# Patient Record
Sex: FEMALE | Employment: OTHER | URBAN - METROPOLITAN AREA
[De-identification: names, ages, dates, MRNs, and addresses within clinical notes are randomized per-mention and may not be internally consistent; named-entity substitution may affect disease eponyms.]

---

## 2018-01-20 ENCOUNTER — HOSPITAL ENCOUNTER (EMERGENCY)
Age: 83
Discharge: HOME | End: 2018-01-20
Attending: EMERGENCY MEDICINE
Payer: MEDICARE

## 2018-01-20 ENCOUNTER — APPOINTMENT (OUTPATIENT)
Dept: GENERAL RADIOLOGY | Age: 83
End: 2018-01-20
Attending: EMERGENCY MEDICINE
Payer: MEDICARE

## 2018-01-20 VITALS
HEART RATE: 71 BPM | RESPIRATION RATE: 19 BRPM | WEIGHT: 116.8 LBS | OXYGEN SATURATION: 99 % | BODY MASS INDEX: 22.93 KG/M2 | DIASTOLIC BLOOD PRESSURE: 71 MMHG | TEMPERATURE: 98.2 F | HEIGHT: 60 IN | SYSTOLIC BLOOD PRESSURE: 140 MMHG
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
## 2018-01-20 PROCEDURE — 99284 EMERGENCY DEPT VISIT MOD MDM: CPT

## 2018-01-20 PROCEDURE — 71101 X-RAY EXAM UNILAT RIBS/CHEST: CPT

## 2018-01-20 RX ORDER — METHOCARBAMOL 750 MG/1
750 TABLET, FILM COATED ORAL
Qty: 20 TAB | Refills: 0 | Status: SHIPPED | OUTPATIENT
Start: 2018-01-20

## 2018-01-20 RX ORDER — OMEPRAZOLE 20 MG/1
20 CAPSULE, DELAYED RELEASE ORAL
COMMUNITY

## 2018-01-20 RX ORDER — DOCUSATE SODIUM 100 MG/1
100 CAPSULE, LIQUID FILLED ORAL
COMMUNITY

## 2018-01-20 RX ORDER — LEVOTHYROXINE SODIUM 150 UG/1
150 TABLET ORAL
COMMUNITY

## 2018-01-20 NOTE — PROGRESS NOTES
Prior to Admission Medications   Prescriptions Last Dose Informant Patient Reported? Taking? CALCIUM PHOSPHATE TRIB/VIT D3 (CITRACAL + D PO) 2018 at 2100  Yes Yes   Sig: Take 2 capsules by mouth daily. Denosumab (PROLIA) 60 mg/mL injection Not Taking at Unknown time  Yes No   Si mg by SubCUTAneous route Multiple. Every 6 months   INSULIN ASPART (NOVOLOG SC) 2018 at Unknown time  Yes Yes   Sig: by SubCUTAneous route as needed. Before lunch and dinner   docusate sodium (COLACE) 100 mg capsule 2018 at 2100  Yes Yes   Sig: Take 100 mg by mouth nightly. insulin glargine (TOUJEO SOLOSTAR) 300 unit/mL (1.5 mL) inpn 2018 at 2100  Yes Yes   Si Units by SubCUTAneous route nightly. levothyroxine (SYNTHROID) 150 mcg tablet 2018 at 0800  Yes Yes   Sig: Take 150 mcg by mouth Daily (before breakfast). omeprazole (PRILOSEC) 20 mg capsule 2017 at Unknown time  Yes Yes   Sig: Take 20 mg by mouth daily as needed. polyethylene glycol (MIRALAX) 17 gram/dose powder Not Taking at Unknown time  Yes No   Sig: Take 17 g by mouth two (2) times a day. Facility-Administered Medications: None   Self and son Dot Londono): List updated per patient and son Dot Londono) interview. Prilosec, colace and toujeo added. Synthroid and novolog dosage updated. Zocor, zoloft, duragesic, norco, lisinopril, and senna removed. Denies use of any other supplements, drops, creams and patches.

## 2018-01-20 NOTE — ED TRIAGE NOTES
Triage note: Pt states she fell 2 days ago. Pt states she thinks she broke her rib on the lower right chest.  Denies syncope.

## 2018-01-20 NOTE — ED PROVIDER NOTES
HPI Comments: Olman Webster is a [address] y.o. female who presents by way of wheelchair to the ED with  c/o right sided pain post fall. Patient states she fell two days ago onto her right side, hitting her ribs and head. Patient denies syncopal symptoms, stating she turned too quickly and fell. Patients son, who is at bedside and is a family physician, states this happens frequently and \"is vestibular in nature. \" Patient and family refuse the need for a head CT as patient is not on anticoagulation nor did she lose consciousness. Patient states she simply wants to know if she broke a rib. Patient denies chest pain, denies any shortness of breath. Denies any nausea or vomiting. Denies any other injuries from a fall. There are no further complaints at this time. Of Note: patients  is hospitalized at this time. Patient lives alone with son there frequently. PCP: Hubert Stevens MD    PMHx significant for: Past Medical History:  No date: Arthritis  No date: Calculus of kidney      Comment: no hx of renal stones ;had gallstones  No date: Cancer [facility])      Comment: pancreatic  No date: Chronic pain  No date: Diabetes (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 The history is provided by the patient and a relative.         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         Family History:   Problem Relation Age of Onset    Psychiatric Disorder Son     Diabetes Child     Heart Disease Child     Diabetes Mother        Social History     Social History    Marital status:      Spouse name: N/A    Number of children: N/A    Years of education: N/A     Occupational History    retired      Social History Main Topics    Smoking status: Never Smoker    Smokeless tobacco: Never Used    Alcohol use No    Drug use: No    Sexual activity: No     Other Topics Concern    Not on file     Social History Narrative         ALLERGIES: Review of patient's allergies indicates no known allergies. Review of Systems   Constitutional: Negative for appetite change, chills, diaphoresis, fatigue and fever. HENT: Negative for congestion, ear discharge, ear pain, sinus pain, sinus pressure, sore throat and trouble swallowing. Eyes: Negative for photophobia, pain, redness and visual disturbance. Respiratory: Negative for chest tightness, shortness of breath and wheezing. C/o pain right upper ribs   Cardiovascular: Negative for chest pain and palpitations. Gastrointestinal: Negative for abdominal distention, abdominal pain, nausea and vomiting. Endocrine: Negative. Genitourinary: Negative for difficulty urinating, flank pain, frequency and urgency. Musculoskeletal: Negative for back pain, neck pain and neck stiffness. Skin: Negative for color change, pallor, rash and wound. Allergic/Immunologic: Negative.     Neurological: Negative for dizziness, speech difficulty, weakness and headaches. Hematological: Does not bruise/bleed easily. Psychiatric/Behavioral: Negative for behavioral problems. The patient is not nervous/anxious. Vitals:    01/20/18 1005   BP: 149/76   Pulse: 70   Resp: 16   Temp: 98.1 °F (36.7 °C)   SpO2: 97%   Weight: 53 kg (116 lb 12.8 oz)   Height: 5' (1.524 m)            Physical Exam   Constitutional: She is oriented to person, place, and time. She appears well-developed and well-nourished. No distress. HENT:   Head: Normocephalic and atraumatic. Right Ear: External ear normal.   Left Ear: External ear normal.   Nose: Nose normal.   Mouth/Throat: Oropharynx is clear and moist.   Eyes: Conjunctivae and EOM are normal. Pupils are equal, round, and reactive to light. Right eye exhibits no discharge. Left eye exhibits no discharge. Neck: Normal range of motion. Neck supple. No JVD present. No tracheal deviation present. Cardiovascular: Normal rate, regular rhythm, normal heart sounds and intact distal pulses. Exam reveals no gallop. No murmur heard. Pulmonary/Chest: Effort normal and breath sounds normal. No respiratory distress. She has no wheezes. She has no rales. She exhibits tenderness (right upper ribs). Abdominal: Soft. Bowel sounds are normal. She exhibits no distension. There is no tenderness. There is no rebound and no guarding. Genitourinary:   Genitourinary Comments: Negative     Musculoskeletal: Normal range of motion. She exhibits tenderness (right upper ribs). She exhibits no edema. Neurological: She is alert and oriented to person, place, and time. Skin: Skin is warm and dry. No rash noted. No erythema. No pallor. Psychiatric: She has a normal mood and affect. Her behavior is normal. Judgment and thought content normal.   Nursing note and vitals reviewed.        MDM  Number of Diagnoses or Management Options  Accidental fall, initial encounter: new and requires workup  Diagnosis management comments: Plan:  Discharge to home and follow up with PCP. Patient and family refusing head CT. Amount and/or Complexity of Data Reviewed  Tests in the radiology section of CPT®: ordered and reviewed      ED Course    1030: Patient and family refuse need for head CT secondary to fall. 1110: Reviewed radiology reads: negative for acute injury. 1115: Reviewed plan of care with Dr. Sis Acharya. 1120: Spoke with family and patient about diagnosis and plan of care. All questions answered. Patient and family continue to decline need for head CT post fall. 11:26 AM  Pt has been reexamined. Pt has no new complaints, changes or physical findings. Care plan outlined and precautions discussed. All available results were reviewed with pt. All medications were reviewed with pt. All of pt's questions and concerns were addressed. Pt agrees to F/U as instructed and agrees to return to ED upon further deterioration. Pt is ready to go home.   Florida Upton NP        Procedures

## 2018-01-20 NOTE — DISCHARGE INSTRUCTIONS

## 2018-03-04 ENCOUNTER — HOSPITAL ENCOUNTER (EMERGENCY)
Age: 83
Discharge: HOME | End: 2018-03-04
Attending: EMERGENCY MEDICINE
Payer: MEDICARE

## 2018-03-04 ENCOUNTER — APPOINTMENT (OUTPATIENT)
Dept: CT IMAGING | Age: 83
End: 2018-03-04
Attending: EMERGENCY MEDICINE
Payer: MEDICARE

## 2018-03-04 ENCOUNTER — APPOINTMENT (OUTPATIENT)
Dept: GENERAL RADIOLOGY | Age: 83
End: 2018-03-04
Attending: EMERGENCY MEDICINE
Payer: MEDICARE

## 2018-03-04 VITALS
SYSTOLIC BLOOD PRESSURE: 174 MMHG | OXYGEN SATURATION: 95 % | BODY MASS INDEX: 21.6 KG/M2 | TEMPERATURE: 98 F | HEIGHT: 60 IN | DIASTOLIC BLOOD PRESSURE: 75 MMHG | RESPIRATION RATE: 16 BRPM | HEART RATE: 80 BPM | WEIGHT: 110 LBS

## 2018-03-04 DIAGNOSIS — R10.84 ABDOMINAL PAIN, GENERALIZED: ICD-10-CM

## 2018-03-04 DIAGNOSIS — S39.012A BACK STRAIN, INITIAL ENCOUNTER: Primary | ICD-10-CM

## 2018-03-04 DIAGNOSIS — S20.212A CONTUSION OF LEFT CHEST WALL, INITIAL ENCOUNTER: ICD-10-CM

## 2018-03-04 LAB
ALBUMIN SERPL-MCNC: 3.2 G/DL (ref 3.5–5)
ALBUMIN/GLOB SERPL: 0.6 {RATIO} (ref 1.1–2.2)
ALP SERPL-CCNC: 83 U/L (ref 45–117)
ALT SERPL-CCNC: 18 U/L (ref 12–78)
ANION GAP SERPL CALC-SCNC: 6 MMOL/L (ref 5–15)
APPEARANCE UR: ABNORMAL
AST SERPL-CCNC: 16 U/L (ref 15–37)
BACTERIA URNS QL MICRO: NEGATIVE /HPF
BASOPHILS # BLD: 0.1 K/UL (ref 0–0.1)
BASOPHILS NFR BLD: 1 % (ref 0–1)
BILIRUB SERPL-MCNC: 0.4 MG/DL (ref 0.2–1)
BILIRUB UR QL: NEGATIVE
BUN SERPL-MCNC: 22 MG/DL (ref 6–20)
BUN/CREAT SERPL: 24 (ref 12–20)
CALCIUM SERPL-MCNC: 9.6 MG/DL (ref 8.5–10.1)
CHLORIDE SERPL-SCNC: 103 MMOL/L (ref 97–108)
CO2 SERPL-SCNC: 28 MMOL/L (ref 21–32)
COLOR UR: ABNORMAL
CREAT SERPL-MCNC: 0.9 MG/DL (ref 0.55–1.02)
DIFFERENTIAL METHOD BLD: ABNORMAL
EOSINOPHIL # BLD: 0.3 K/UL (ref 0–0.4)
EOSINOPHIL NFR BLD: 2 % (ref 0–7)
EPITH CASTS URNS QL MICRO: ABNORMAL /LPF
ERYTHROCYTE [DISTWIDTH] IN BLOOD BY AUTOMATED COUNT: 13.3 % (ref 11.5–14.5)
GLOBULIN SER CALC-MCNC: 5 G/DL (ref 2–4)
GLUCOSE SERPL-MCNC: 216 MG/DL (ref 65–100)
GLUCOSE UR STRIP.AUTO-MCNC: 500 MG/DL
HCT VFR BLD AUTO: 39.9 % (ref 35–47)
HGB BLD-MCNC: 13 G/DL (ref 11.5–16)
HGB UR QL STRIP: ABNORMAL
IMM GRANULOCYTES # BLD: 0.2 K/UL (ref 0–0.04)
IMM GRANULOCYTES NFR BLD AUTO: 1 % (ref 0–0.5)
KETONES UR QL STRIP.AUTO: NEGATIVE MG/DL
LEUKOCYTE ESTERASE UR QL STRIP.AUTO: ABNORMAL
LYMPHOCYTES # BLD: 1.7 K/UL (ref 0.8–3.5)
LYMPHOCYTES NFR BLD: 11 % (ref 12–49)
MCH RBC QN AUTO: 30.7 PG (ref 26–34)
MCHC RBC AUTO-ENTMCNC: 32.6 G/DL (ref 30–36.5)
MCV RBC AUTO: 94.1 FL (ref 80–99)
MONOCYTES # BLD: 1.1 K/UL (ref 0–1)
MONOCYTES NFR BLD: 7 % (ref 5–13)
NEUTS SEG # BLD: 11.3 K/UL (ref 1.8–8)
NEUTS SEG NFR BLD: 77 % (ref 32–75)
NITRITE UR QL STRIP.AUTO: NEGATIVE
NRBC # BLD: 0 K/UL (ref 0–0.01)
NRBC BLD-RTO: 0 PER 100 WBC
PH UR STRIP: 6.5 [PH] (ref 5–8)
PLATELET # BLD AUTO: 399 K/UL (ref 150–400)
PMV BLD AUTO: 10.3 FL (ref 8.9–12.9)
POTASSIUM SERPL-SCNC: 4.5 MMOL/L (ref 3.5–5.1)
PROT SERPL-MCNC: 8.2 G/DL (ref 6.4–8.2)
PROT UR STRIP-MCNC: NEGATIVE MG/DL
RBC # BLD AUTO: 4.24 M/UL (ref 3.8–5.2)
RBC #/AREA URNS HPF: ABNORMAL /HPF (ref 0–5)
SODIUM SERPL-SCNC: 137 MMOL/L (ref 136–145)
SP GR UR REFRACTOMETRY: 1.01 (ref 1–1.03)
UROBILINOGEN UR QL STRIP.AUTO: 0.2 EU/DL (ref 0.2–1)
WBC # BLD AUTO: 14.7 K/UL (ref 3.6–11)
WBC URNS QL MICRO: ABNORMAL /HPF (ref 0–4)

## 2018-03-04 PROCEDURE — 96374 THER/PROPH/DIAG INJ IV PUSH: CPT

## 2018-03-04 PROCEDURE — 85025 COMPLETE CBC W/AUTO DIFF WBC: CPT | Performed by: EMERGENCY MEDICINE

## 2018-03-04 PROCEDURE — 74176 CT ABD & PELVIS W/O CONTRAST: CPT

## 2018-03-04 PROCEDURE — 87086 URINE CULTURE/COLONY COUNT: CPT | Performed by: EMERGENCY MEDICINE

## 2018-03-04 PROCEDURE — 71101 X-RAY EXAM UNILAT RIBS/CHEST: CPT

## 2018-03-04 PROCEDURE — 36415 COLL VENOUS BLD VENIPUNCTURE: CPT | Performed by: EMERGENCY MEDICINE

## 2018-03-04 PROCEDURE — 80053 COMPREHEN METABOLIC PANEL: CPT | Performed by: EMERGENCY MEDICINE

## 2018-03-04 PROCEDURE — 72100 X-RAY EXAM L-S SPINE 2/3 VWS: CPT

## 2018-03-04 PROCEDURE — 99284 EMERGENCY DEPT VISIT MOD MDM: CPT

## 2018-03-04 PROCEDURE — 72170 X-RAY EXAM OF PELVIS: CPT

## 2018-03-04 PROCEDURE — 81001 URINALYSIS AUTO W/SCOPE: CPT | Performed by: EMERGENCY MEDICINE

## 2018-03-04 PROCEDURE — 74011250636 HC RX REV CODE- 250/636: Performed by: EMERGENCY MEDICINE

## 2018-03-04 RX ORDER — KETOROLAC TROMETHAMINE 30 MG/ML
10 INJECTION, SOLUTION INTRAMUSCULAR; INTRAVENOUS
Status: COMPLETED | OUTPATIENT
Start: 2018-03-04 | End: 2018-03-04

## 2018-03-04 RX ADMIN — KETOROLAC TROMETHAMINE 10 MG: 30 INJECTION, SOLUTION INTRAMUSCULAR at 13:32

## 2018-03-04 NOTE — ED TRIAGE NOTES
Pt fell on her lower back this morning when attempted to sit in her which she was not aware  had fallen back. Pt did not his her head and had no LOC. Pt has a small skin tear to her right elbow with fall and complains of pain across her lower back.

## 2018-03-04 NOTE — ED PROVIDER NOTES
HPI Comments: 80 y.o. female with past medical history significant for GERD, Diabetes, and Cancer who presents from home via EMS with chief complaint of back pain. Pt states this morning she went to sit down and missed a chair causing her to fall backwards onto her lower back area. Pt denies a loss of consciousness from fall, but reports lower back pain. Patient states worsening symptoms since onset. Pt reports moderate lower back pain with no radiation. Pt states aggravation of symptoms with positional movement. Pt denies taking medication for symptoms prior to arrival. Pt states accompanying abdominal pain. Pt reports constant spinal pain at baseline, but notes an increase in pain post fall. Pt states a previous history of a fall and was seen at [facility] ED on 01/20/[facility]. Pt completed an x-ray and was discharged home. Pt denies fever, chills, cough, congestion, shortness of breath, chest pain, nausea, vomiting, diarrhea, difficulty with urination or dysuria. There are no other acute medical concerns at this time. PCP: Marion Rich MD    Note written by Tyler Molina, as dictated by Milton Messer MD 12:44 PM    The history is provided by the patient.         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         Family History:   Problem Relation Age of Onset    Psychiatric Disorder Son     Diabetes Child     Heart Disease Child     Diabetes Mother        Social History     Social History    Marital status:      Spouse name: N/A    Number of children: N/A    Years of education: N/A     Occupational History    retired      Social History Main Topics    Smoking status: Never Smoker    Smokeless tobacco: Never Used    Alcohol use No    Drug use: No    Sexual activity: No     Other Topics Concern    Not on file     Social History Narrative         ALLERGIES: Review of patient's allergies indicates no known allergies. Review of Systems   Constitutional: Negative for chills and fever. HENT: Negative for congestion. Respiratory: Negative for cough and shortness of breath. Cardiovascular: Negative for chest pain. Gastrointestinal: Positive for abdominal pain. Negative for diarrhea, nausea and vomiting. Genitourinary: Negative for difficulty urinating and dysuria. Musculoskeletal: Positive for back pain. All other systems reviewed and are negative. Vitals:    03/04/18 1227   BP: 174/75   Pulse: 80   Resp: 16   Temp: 98 °F (36.7 °C)   SpO2: 95%   Weight: 49.9 kg (110 lb)   Height: 5' (1.524 m)            Physical Exam   Constitutional: She appears well-developed and well-nourished. HENT:   Head: Normocephalic and atraumatic. Mouth/Throat: Oropharynx is clear and moist.   Eyes: EOM are normal. Pupils are equal, round, and reactive to light. Neck: Normal range of motion. Neck supple. Cardiovascular: Normal rate, regular rhythm and intact distal pulses. Exam reveals no gallop and no friction rub. Murmur heard. Systolic murmur is present with a grade of 3/6   Pulmonary/Chest: Effort normal. No respiratory distress. She has no wheezes. She has no rales. Tender over left distal posterior chest wall   Abdominal: Soft. There is tenderness. There is no rebound. Diffuse abdominal tenderness upper greater than lower   Musculoskeletal: Normal range of motion. She exhibits no tenderness. Neurological: She is alert. No cranial nerve deficit.    Motor; symmetric   Skin: No erythema. Psychiatric: She has a normal mood and affect. Her behavior is normal.   Nursing note and vitals reviewed.    Note written by Tyler Ridley, as dictated by Max Brower MD 12:44 PM    [facility]      ED Course       Procedures

## 2018-03-06 LAB
BACTERIA SPEC CULT: NORMAL
CC UR VC: NORMAL
SERVICE CMNT-IMP: NORMAL

## 2018-03-06 NOTE — CALL BACK NOTE
[facility] Services Emergency Department Follow Up Call Record    Discharged to : Home/Family Home/Home Health/Skilled Facility/Rehab/Assisted Living/Other    Home_______  1) Did you receive your discharge instructions? Yes Patient states her son has discharge instructions.  verified with Mrs. Alfaro. She complains of left lower back pain today. \"So bad I could not get out of bed\". She could not write down number for  [facility] due to her eye sight, but she lives alone. This writer contacted her son Andre Alvarado ,per her request. Andre Alvarado wrote down number for [facility] if she should need it for their help. He is going to her home today to see her. Mrs. Alfaro assured me she would be \"alright\". \"She feels better since taking Motrin. Encouraged her to please follow up with PCP Or [facility] or ED if urgently  in need. 2) Do you understand them? No  Son, Andre Alvarado states he does not understand why mother did not receive pain medication and that he is a doctor. 3) Are you able to follow them? Mrs. Kristin Shukla nor the son has contacted PCP for follow up. If NO, what can I clarify for you? Need for follow up with PCP. 4) Do you understand your diagnosis? Yes Reviewed treatments and results. 5) Do you know which symptoms should prompt you to call the doctor? Yes     6) Were you able to fill and  any medications that were prescribed? Not applicable     7) You were prescribed __n/a_________for ____________________. Common side effects of this medication are____________________. This is not a complete list so please review the forms given from the pharmacy for a complete list.      8) Are there any questions about your medications? Yes . Mrs. Alfaro did not receive prescribed medications. She reports having taken Motrin for pain.             Have you scheduled any recommended doctors appointments (specialty, PCP) NO  If NO, what barriers are you encountering (transportation/lost contact info/cost/  didnt think necessary/no PCP   Mrs. Alfaro has to rely on hr son to help her with transportation and making appointments. 9) If discharged with Home Health, has the agency contacted you to schedule visit? N/A  10) Is there anyone available to help you at home (meals, errands, transportation    monitoring) (adult children, neighbors, private duty companions) No  Mrs. Alfaro reports living alone. Her son Mary Morgan checks in on her.    6) Are you on a special diet? NO         If YES, do you understand the requirements for this diet? Education provided? 12) If presented with cough, bronchitis, COPD, asthma, is it ok to ask that the   respiratory disease management educator call you? Not applicable      13)  A) If presented with fall, were you issued an assistive device in the ED    Are you using? NO. B) If given RX for device, have you obtained? Not applicable       If NO, barriers? C) Therapist recommended:NO   Are you able to implement the suggestions? Not applicable        If NO, barriers to implementation? D) Are you having any difficulties with mobility inside your home?     (steps, bed, tub)Yes. Patient and son report patient has vision problems. If YES, ask if the [facility] PT can contact patient and good time and number?  14)  At the end of your discharge instructions, there is information about accessing [facility], have you had a chance to review those? Not applicable         Do you have any questions about signing up for this service? NO   We encourage our patients to be active participants in their healthcare and this site is one of the ways to do that. It will allow you to access parts of your medical record, email your doctors office, schedule appointments, and request medications refills . 15) Are there any other questions that I can answer for you regarding    your Emergency department visit?  YES    Son reports dissatisfaction with ED physician not discharging his mother with pain medication prescription.              Estimated Call Time:___10:57 AM  ________________ Date/Time:_______________

## 2018-04-17 ENCOUNTER — HOSPITAL ENCOUNTER (EMERGENCY)
Age: 83
Discharge: HOME | End: 2018-04-17
Attending: EMERGENCY MEDICINE
Payer: MEDICARE

## 2018-04-17 ENCOUNTER — APPOINTMENT (OUTPATIENT)
Dept: GENERAL RADIOLOGY | Age: 83
End: 2018-04-17
Attending: PHYSICIAN ASSISTANT
Payer: MEDICARE

## 2018-04-17 VITALS
SYSTOLIC BLOOD PRESSURE: 141 MMHG | TEMPERATURE: 98 F | HEART RATE: 72 BPM | OXYGEN SATURATION: 96 % | DIASTOLIC BLOOD PRESSURE: 78 MMHG | RESPIRATION RATE: 16 BRPM

## 2018-04-17 DIAGNOSIS — R10.9 FLANK PAIN, ACUTE: Primary | ICD-10-CM

## 2018-04-17 DIAGNOSIS — N30.00 ACUTE CYSTITIS WITHOUT HEMATURIA: ICD-10-CM

## 2018-04-17 LAB
ALBUMIN SERPL-MCNC: 3.2 G/DL (ref 3.5–5)
ALBUMIN/GLOB SERPL: 0.6 {RATIO} (ref 1.1–2.2)
ALP SERPL-CCNC: 125 U/L (ref 45–117)
ALT SERPL-CCNC: 20 U/L (ref 12–78)
ANION GAP SERPL CALC-SCNC: 6 MMOL/L (ref 5–15)
APPEARANCE UR: ABNORMAL
AST SERPL-CCNC: 14 U/L (ref 15–37)
BACTERIA URNS QL MICRO: NEGATIVE /HPF
BASOPHILS # BLD: 0.1 K/UL (ref 0–0.1)
BASOPHILS NFR BLD: 1 % (ref 0–1)
BILIRUB SERPL-MCNC: 0.4 MG/DL (ref 0.2–1)
BILIRUB UR QL: NEGATIVE
BUN SERPL-MCNC: 18 MG/DL (ref 6–20)
BUN/CREAT SERPL: 20 (ref 12–20)
CALCIUM SERPL-MCNC: 9.2 MG/DL (ref 8.5–10.1)
CHLORIDE SERPL-SCNC: 105 MMOL/L (ref 97–108)
CO2 SERPL-SCNC: 28 MMOL/L (ref 21–32)
COLOR UR: ABNORMAL
CREAT SERPL-MCNC: 0.9 MG/DL (ref 0.55–1.02)
DIFFERENTIAL METHOD BLD: ABNORMAL
EOSINOPHIL # BLD: 0.5 K/UL (ref 0–0.4)
EOSINOPHIL NFR BLD: 5 % (ref 0–7)
EPITH CASTS URNS QL MICRO: ABNORMAL /LPF
ERYTHROCYTE [DISTWIDTH] IN BLOOD BY AUTOMATED COUNT: 15.5 % (ref 11.5–14.5)
GLOBULIN SER CALC-MCNC: 5.1 G/DL (ref 2–4)
GLUCOSE SERPL-MCNC: 196 MG/DL (ref 65–100)
GLUCOSE UR STRIP.AUTO-MCNC: NEGATIVE MG/DL
HCT VFR BLD AUTO: 39.6 % (ref 35–47)
HGB BLD-MCNC: 12.8 G/DL (ref 11.5–16)
HGB UR QL STRIP: ABNORMAL
HYALINE CASTS URNS QL MICRO: ABNORMAL /LPF (ref 0–5)
IMM GRANULOCYTES # BLD: 0 K/UL (ref 0–0.04)
IMM GRANULOCYTES NFR BLD AUTO: 0 % (ref 0–0.5)
KETONES UR QL STRIP.AUTO: ABNORMAL MG/DL
LEUKOCYTE ESTERASE UR QL STRIP.AUTO: ABNORMAL
LYMPHOCYTES # BLD: 2 K/UL (ref 0.8–3.5)
LYMPHOCYTES NFR BLD: 17 % (ref 12–49)
MCH RBC QN AUTO: 31.6 PG (ref 26–34)
MCHC RBC AUTO-ENTMCNC: 32.3 G/DL (ref 30–36.5)
MCV RBC AUTO: 97.8 FL (ref 80–99)
MONOCYTES # BLD: 0.9 K/UL (ref 0–1)
MONOCYTES NFR BLD: 7 % (ref 5–13)
NEUTS SEG # BLD: 8 K/UL (ref 1.8–8)
NEUTS SEG NFR BLD: 70 % (ref 32–75)
NITRITE UR QL STRIP.AUTO: NEGATIVE
NRBC # BLD: 0 K/UL (ref 0–0.01)
NRBC BLD-RTO: 0 PER 100 WBC
PH UR STRIP: 6 [PH] (ref 5–8)
PLATELET # BLD AUTO: 410 K/UL (ref 150–400)
PMV BLD AUTO: 9.9 FL (ref 8.9–12.9)
POTASSIUM SERPL-SCNC: 4.1 MMOL/L (ref 3.5–5.1)
PROT SERPL-MCNC: 8.3 G/DL (ref 6.4–8.2)
PROT UR STRIP-MCNC: 30 MG/DL
RBC # BLD AUTO: 4.05 M/UL (ref 3.8–5.2)
RBC #/AREA URNS HPF: ABNORMAL /HPF (ref 0–5)
SODIUM SERPL-SCNC: 139 MMOL/L (ref 136–145)
SP GR UR REFRACTOMETRY: 1.03 (ref 1–1.03)
UR CULT HOLD, URHOLD: NORMAL
UROBILINOGEN UR QL STRIP.AUTO: 1 EU/DL (ref 0.2–1)
WBC # BLD AUTO: 11.5 K/UL (ref 3.6–11)
WBC URNS QL MICRO: >100 /HPF (ref 0–4)

## 2018-04-17 PROCEDURE — 81001 URINALYSIS AUTO W/SCOPE: CPT | Performed by: PHYSICIAN ASSISTANT

## 2018-04-17 PROCEDURE — G8980 MOBILITY D/C STATUS: HCPCS

## 2018-04-17 PROCEDURE — 80053 COMPREHEN METABOLIC PANEL: CPT | Performed by: PHYSICIAN ASSISTANT

## 2018-04-17 PROCEDURE — 74011250637 HC RX REV CODE- 250/637: Performed by: EMERGENCY MEDICINE

## 2018-04-17 PROCEDURE — 73502 X-RAY EXAM HIP UNI 2-3 VIEWS: CPT

## 2018-04-17 PROCEDURE — 36415 COLL VENOUS BLD VENIPUNCTURE: CPT | Performed by: PHYSICIAN ASSISTANT

## 2018-04-17 PROCEDURE — G8979 MOBILITY GOAL STATUS: HCPCS

## 2018-04-17 PROCEDURE — 99284 EMERGENCY DEPT VISIT MOD MDM: CPT

## 2018-04-17 PROCEDURE — G8978 MOBILITY CURRENT STATUS: HCPCS

## 2018-04-17 PROCEDURE — 85025 COMPLETE CBC W/AUTO DIFF WBC: CPT | Performed by: PHYSICIAN ASSISTANT

## 2018-04-17 PROCEDURE — 97161 PT EVAL LOW COMPLEX 20 MIN: CPT

## 2018-04-17 PROCEDURE — 97116 GAIT TRAINING THERAPY: CPT

## 2018-04-17 PROCEDURE — 72100 X-RAY EXAM L-S SPINE 2/3 VWS: CPT

## 2018-04-17 RX ORDER — NAPROXEN 250 MG/1
250 TABLET ORAL
Status: COMPLETED | OUTPATIENT
Start: 2018-04-17 | End: 2018-04-17

## 2018-04-17 RX ORDER — CEPHALEXIN 500 MG/1
500 CAPSULE ORAL 3 TIMES DAILY
Qty: 20 CAP | Refills: 0 | Status: SHIPPED | OUTPATIENT
Start: 2018-04-17 | End: 2018-04-24

## 2018-04-17 RX ADMIN — NAPROXEN 250 MG: 250 TABLET ORAL at 13:41

## 2018-04-17 NOTE — ED TRIAGE NOTES
C/o right lower back pain x 1 month after a fall. Pt admits to falling again since last visit. Pt states she is unable to work.

## 2018-04-17 NOTE — DISCHARGE INSTRUCTIONS

## 2018-04-17 NOTE — SENIOR SERVICES NOTE
physical Therapy Emergency Department EVALUATION/DISCHARGE  Patient: Fallon Rivers (33 y.o. female)  Date: 4/17/2018  Primary Diagnosis: There are no admission diagnoses documented for this encounter. Precautions:      ASSESSMENT :  Chart reviewed. Patient cleared to be seen by MD.  Patient presents to ED following worsening LBP in the past week. Patient fell about one month ago with visit to ED without any acute abnormalities. Patient also fell about a week ago and her LBP has not improved since. X-ray negative for acute fracture or subluxation, revealing grossly stable multiple treated and untreated lumbar compression fractures Based on the objective data described below, the patient presents with low back pain, decreased endurance, and decreased gait speed. However, patient approaching functional baseline. Educated patient and son on log rolling technique. Transferred EOB with mod A secondary to pain in R low back. Pt has assist with bed mobility at home as needed. Patient able to sit EOB with CGA and no reports of dizziness. Transferred sit to stand with CGA with verbal cuing to perform with control. Patient ambulated 150 feet total with single-point cane with verbal cuing for appropriate technique to promote safety. Patient reported mild SOB after ambulating 100 feet, but demonstrated quick recovery with standing rest break. Patient returned to bed with mod A x 2 to prevent pain exacerbation. Patient reported no change in pain from before to after session today. Educated patient and caregiver on adjusting fit on cane and fall prevention strategies. Caregiver demonstrating understanding through verbal report. Educated patient and caregiver on the benefits of pursuing HHPT to improve functional mobility in the home. At this time, patient/caregiver deferring referral to [address]. Plan to follow up with phone call.     Discussed assessment with MD.  Recommend patient to return home with family assist as needed. Further acute physical therapy is not indicated at this time. PLAN :  Discharge Recommendations:     [x]   Home with family  []   Skilled nursing facility  []   Admission to hospital with rehab likely needed  []   Inpatient rehab referral  []   Outpatient physical therapy referral  [x]   Other: Recommend to follow up with PCP to determine if appropriate for HHPT    Further Equipment Recommendations for Discharge: none, has 3 canes at home and a RW. RW not ideal secondary to visual deficits. []   Rolling walker with 5\" wheels  []   Crutches   []   Cane   []   Wheelchair   []   Other:     COMMUNICATION/EDUCATION:   Communication/Collaboration:  [x]   Fall prevention education was provided and the patient/caregiver indicated understanding. [x]   Patient/family have participated as able and agree with findings and recommendations. []   Patient is unable to participate in plan of care at this time. Findings and recommendations were discussed with: MD physician        SUBJECTIVE:   Patient stated Rios Cason I try by myself for a little bit and see what happens. 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 Son reports that he lives at home with her. She ambulates with a cane at baseline.   Has a rolling walker after previous back surgery; however, patient prefers cane secondary to vision impairments. Son reports walker is too large to navigate around furniture at home. Son assists patient with most activities at home due to concerns for safety. Patient presented to ED for a fall last month without acute abnormalities. Patient fell about one week ago in the home. Personal factors and/or comorbidities impacting plan of care:     Home Situation  Home Environment: Private residence  Wheelchair Ramp: Yes  One/Two Story Residence: Two story  # of Interior Steps: 13  Interior Rails: Both (Left side railing present only long term)  Living Alone: No  Support Systems: Family member(s)  Patient Expects to be Discharged to[de-identified] Private residence  Current DME Used/Available at Home: joan Alatorre    EXAMINATION/PRESENTATION/DECISION MAKING:   Range Of Motion:  AROM: Generally decreased, functional  PROM: Generally decreased, functional     Strength:    Strength: Generally decreased, functional         Coordination:  Coordination: Generally decreased, functional  Vision:   Per son report, patient has impaired vision. Functional Mobility:  Bed Mobility:  Rolling: Moderate assistance  Supine to Sit: Moderate assistance  Sit to Supine: Moderate assistance x 2     Transfers:  Sit to Stand: Contact guard assistance  Stand to Sit: Contact guard assistance     Balance:   Sitting: Intact; With support  Standing: Intact; With support  Ambulation/Gait Training:  Distance (ft): 150 Feet (ft)  Assistive Device: Cane, straight;Gait belt  Ambulation - Level of Assistance: Contact guard assistance; Adaptive equipment  Gait Abnormalities: Trunk sway increased  Base of Support: Narrowed; Shift to left  Speed/Colleen: Fluctuations  Step Length: Right shortened       Special Tests:  10 Meter walk test:  (Specify if any supplemental oxygen is used, the type, pre, during and post sats.)    Self-Selected Or Fast-Velocity: Self Selected Velocity  Trial 1: 11.5 Seconds  Trial 2: 11.5 Seconds  Trial 3: 11.5 Seconds   Average : 11.5 Seconds  Score: 0.87 m/s             Walking Speed (m/s)  Modifier Scale Age 52-63 Age 61-76 Age 66-77 Age 80-80    Male Female Male Female Male Female Male Female   CH   0% Impaired ? 1.39 ? 1.40 ? 1.36 ? 1.30 ? 1.33 ? 1.27 ? 1.21 ? 1.15   CI   1-19% Impaired 1.11-1.38 1.12-1.39 1.09-1.35 1.04-1.29 1.06-1.32 1.01-1.26 0.96-1.20 0.92-1.14   CJ   20-39% Impaired 0.83-1.10 0.84-1.11 0.82-1.08 0.78-1.03 0.80-1.05 0.76-1.00 0.72-0.95 0.69-0.91   CK   40-59% Impaired 0.56-0.82 0.57-0.83 0.54-0.81 0.52-0.77 0.53-0.79 0.51-0.75 0.48-0.71 0.46-0.68   CL   60-79% Impaired 0.28-0.55 0.28-0.56 0.27-0.53 0.26-0.51 0.27-0.52 0.25-0.50 0.24-0.49 0.23-0.45   CM   80-99% Impaired 0.01-0.28 < 0.01-0.28 < 0.01-0.27 < 0.01-0.26 0.01-0.27 0.01-0.24 0.01-0.23 0.01-0.22   CN   100% Impaired Cannot Perform   Minimal Detectable Change (MDC-90) = 0.1 m/s  Fernanda HIGGINS \"Comfortable and maximum walking speed of adults aged 20-79 years: reference values and determinants. \" Age and Agin Volume 26(1):15-9. Andres Zavaleta \"Age- and gender-related test performance in community-dwelling elderly people: Six-Minute Walk Test, Javier Balance Scale, Timed Up & Go Test, and gait speeds. \" Physical Therapy: 2002 Volume 82(2):128-37. Alejandrina MADDEN, Devang ABDUL, Karyna Munson JD, Easton ROBERSON. \"Assessing stability and change of four performance measures: a longitudinal study evaluating outcome following total hip and knee arthroplasty. \" [facility] Musculoskeletal Disorders: 2005 Volume 6(3). Charity Esqueda, PhD; Jamar Salinas, . Jose Rapp Paper: \"Walking Speed: the Sixth Vital Sign\" Journal of Geriatric Physical Therapy: 2009 - Volume 32 - Issue 2 - p 25 . In compliance with CMSs Claims Based Outcome Reporting, the following G-code set was chosen for this patient based on their primary functional limitation being treated:     The outcome measure chosen to determine the severity of the functional limitation was the 10 MWT with a score of 0.82 m/s which was correlated with the impairment scale. ? Mobility - Walking and Moving Around:     - CURRENT STATUS: CJ - 20%-39% impaired, limited or restricted    - GOAL STATUS: CJ - 20%-39% impaired, limited or restricted    - D/C STATUS:  CJ - 20%-39% impaired, limited or restricted    Physical Therapy Evaluation Charge Determination   History Examination Presentation Decision-Making   MEDIUM  Complexity : 1-2 comorbidities / personal factors will impact the outcome/ POC  LOW Complexity : 1-2 Standardized tests and measures addressing body structure, function, activity limitation and / or participation in recreation  LOW Complexity : Stable, uncomplicated  LOW Complexity : FOTO score of       Based on the above components, the patient evaluation is determined to be of the following complexity level: LOW       Pain:  Pain Scale 1: Numeric (0 - 10)  Pain Intensity 1: 6  Pain Location 1: Back  Pain Orientation 1: Right  Pain Description 1: Aching  Pain Intervention(s) 1: MD notified (comment)  Activity Tolerance:   Patient able to tolerate bed mobility with moderate A from therapist.  Patient able to sit EOB and transfer sit to stand with CGA. Patient ambulated 150 feet with cane on the R with reports of SOB toward end and requested standing rest break. SpO2 determined via pulse ox observed to be 95% with HR at 89 post-activity. Please refer to the flowsheet for vital signs taken during this treatment.   After treatment:   []         Patient left in no apparent distress sitting up in chair  [x]         Patient left in no apparent distress in bed  [x]         Call bell left within reach  [x]         Nursing notified  [x]         Caregiver present  []         Bed alarm activated        Thank you for this referral.  Arun Marie, PT, DPT  Adan Hollis, SPT   Time Calculation: 27 mins     Regarding student involvement in patient care:  A student participated in this treatment session. Per CMS Medicare statements and APTA guidelines I certify that the following was true:  1. I was present and directly observed the entire session. 2. I made all skilled judgments and clinical decisions regarding care. 3. I am the practitioner responsible for assessment, treatment, and documentation.

## 2018-04-17 NOTE — ED PROVIDER NOTES
HPI Comments: 80 y.o. female with past medical history significant for DM, pancreatic cancer, osteoporosis, arthritis, chronic pain and GERD who presents from home accompanied by her son with chief complaint of back pain. Patient reports worsening pain over right posterior ribs since sustaining \"bad fall\" approximately 1 month ago and secondary fall approximately 1 week ago. Patient has been using tylenol and rest as directed with minimal improvement. Patient is ambulatory with a cane at baseline and occasionally uses a walker. Her  is recently  (2018) and she now lives with her two sons. Son is also concerned patient may have a UTI as she was complaining of dysuria last week. Patient specifically denies spinal tenderness and hematuria. There are no other acute medical concerns at this time. Old Chart Review:  Right rib x-ray on 3/4/18 demonstrated no acute process, old fractures. Social hx: never tobacco smoker; denies EtOH use; denies illicit drug use  PCP: Stephanie Bravo MD    Note written by Tyler Banks, as dictated by Reyes Sierras, MD 12:57 PM         The history is provided by the patient and a relative (son). No  was used.         Past Medical History:   Diagnosis Date    Arthritis     Calculus of kidney     no hx of renal stones ;had gallstones    Cancer (HCC)     pancreatic    Chronic pain     Diabetes ([address]     GERD (gastroesophageal reflux disease)     Headache(784.0)     Hypercholesterolemia     Osteoporosis     Thyroid disease     Trauma        Past Surgical History:   Procedure Laterality Date    BREAST SURGERY PROCEDURE UNLISTED      lumpectomy on both breasts    HX APPENDECTOMY      [place] PANCREAS SURGERY PROC UNLISTED      splenectomy in /subtotal pancreactectomy         Family History:   Problem Relation Age of Onset    Psychiatric Disorder Son     Diabetes Child     Heart Disease Child     Diabetes Mother Social History     Social History    Marital status:      Spouse name: N/A    Number of children: N/A    Years of education: N/A     Occupational History    retired      Social History Main Topics    Smoking status: Never Smoker    Smokeless tobacco: Never Used    Alcohol use No    Drug use: No    Sexual activity: No     Other Topics Concern    Not on file     Social History Narrative         ALLERGIES: Review of patient's allergies indicates no known allergies. Review of Systems   Constitutional: Negative for appetite change, chills and fever. HENT: Negative for rhinorrhea, sore throat and trouble swallowing. Eyes: Negative for photophobia. Respiratory: Negative for cough and shortness of breath. Cardiovascular: Negative for chest pain and palpitations. Gastrointestinal: Negative for abdominal pain, nausea and vomiting. Genitourinary: Negative for dysuria, frequency and hematuria. Musculoskeletal: Positive for back pain. Negative for arthralgias. Neurological: Negative for dizziness, syncope and weakness. Psychiatric/Behavioral: Negative for behavioral problems. The patient is not nervous/anxious. Vitals:    04/17/18 1210   BP: 147/83   Pulse: 76   Resp: 16   Temp: 97.8 °F (36.6 °C)   SpO2: 95%            Physical Exam   Constitutional: She appears well-developed and well-nourished. HENT:   Head: Normocephalic and atraumatic. Mouth/Throat: Oropharynx is clear and moist.   Eyes: EOM are normal. Pupils are equal, round, and reactive to light. Neck: Normal range of motion. Neck supple. Cardiovascular: Normal rate, regular rhythm and intact distal pulses. Exam reveals no gallop and no friction rub. Murmur heard. Systolic murmur is present with a grade of 2/6   Pulmonary/Chest: Effort normal. No respiratory distress. She has no wheezes. She has no rales. Abdominal: Soft. There is no rebound. Tender over right flank.     Musculoskeletal: Normal range of motion. She exhibits no tenderness. No spinal tenderness. Neurological: She is alert. No cranial nerve deficit. Motor; symmetric   Skin: No erythema. Psychiatric: She has a normal mood and affect. Her behavior is normal.   Nursing note and vitals reviewed. Note written by Tyler Lange, as dictated by David Esteves MD 12:57 PM    [facility]      ED Course       Procedures    1:02 PM  L-spine x-ray demonstrates grossly stable multiple treated and untreated lumbar compression fractures. Constipation. Right hip x-ray demonstrates no acute abnormality. Will obtain UA and reassess. Note: Patient fell a month ago. She has multiple old compression fractures with multiple old kyphoplasties; she really is complaining of right flank pain. She had complained of urinary tract infection symptoms earlier in the week. Patient can easily lift her legs against gravity. She is able to transfer to the bedside commode. She is comfortable lying flat. Triaged x-rays are unchanged. I reviewed the x-rays and her right lower ribs do not show any gross abnormality. Plan is to ask the physical therapist to see the patient to see if there is any way she can transfer more comfortably. I think it is safe for her to take Aleve  twice a day for a week once in a while. UA is pending. David Esteves MD  1:12 PM    1:49 PM  EB Guaman, at bedside evaluating patient.

## 2018-04-18 NOTE — CALL BACK NOTE
[facility] Emergency Department Follow Up Call Record    Discharged to : Home/Family Home/Home Health/Skilled Facility/Rehab/Assisted Living/Other_Home ______  1) Did you receive your discharge instructions? Yes  This writer spoke with Lea & ANGEL Driscoll verified. She  reports still having the low back pain. She is taking the antibiotic. She asked how long before she would feel better? This writer encouraged her to continue with plan of care, but if continues with this back pain contact her PCP and discuss need for further evaluation. She also knows she can take Aleve for pain. One of he sons is home with her today. She lives with both. 2) Do you understand them? Yes         3) Are you able to follow them? Yes          If NO, what can I clarify for you? 4) Do you understand your diagnosis? Yes         5) Do you know which symptoms should prompt you to call the doctor? Yes     6) Were you able to fill and  any medications that were prescribed? Not applicable     7) You were prescribed ___keflex________for ___Cystitis_________________. Common side effects of this medication are___rash_________________. This is not a complete list so please review the forms given from the pharmacy for a complete list.      8) Are there any questions about your medications? No            Have you scheduled any recommended doctors appointments (specialty, PCP) NO. Encouraged patient to follow up with PCP , have son to make appointment. If NO, what barriers are you encountering (transportation/lost contact info/cost/  didnt think necessary/no PCP   Sons provide transportation  9) If discharged with Home Health, has the agency contacted you to schedule visit? N/A  10) Is there anyone available to help you at home (meals, errands, transportation    monitoring) (adult children, neighbors, private duty companions) Yes Lives with sons   6) Are you on a special diet?  Yes         If YES, do you understand the requirements for this diet? Education provided? 12) If presented with cough, bronchitis, COPD, asthma, is it ok to ask that the   respiratory disease management educator call you? Not applicable      13)  A) If presented with fall, were you issued an assistive device in the ED    Are you using? Yes uses either walker or cane  B) If given RX for device, have you obtained? Not applicable   This patient has own walker and cane       If NO, barriers? C) Therapist recommended:NO   Are you able to implement the suggestions? Not applicable        If NO, barriers to implementation? D) Are you having any difficulties with mobility inside your home?     (steps, bed, tub)Yes. Mrs. Alfaro reports she can walk, but becomes fatigued easily. If YES, ask if the [facility] PT can contact patient and good time and number?  14)  At the end of your discharge instructions, there is information about accessing [facility], have you had a chance to review those? No         Do you have any questions about signing up for this service? NO   We encourage our patients to be active participants in their healthcare and this site is one of the ways to do that. It will allow you to access parts of your medical record, email your doctors office, schedule appointments, and request medications refills . 15) Are there any other questions that I can answer for you regarding    your Emergency department visit?  NO             Estimated Call Time:____3:03 PM  _______________ Date/Time:_______________

## 2018-07-14 ENCOUNTER — HOSPITAL ENCOUNTER (OUTPATIENT)
Dept: MRI IMAGING | Age: 83
Discharge: HOME | End: 2018-07-14
Attending: ORTHOPAEDIC SURGERY
Payer: MEDICARE

## 2018-07-14 DIAGNOSIS — M25.552 LEFT HIP PAIN: ICD-10-CM

## 2018-07-14 PROCEDURE — 73721 MRI JNT OF LWR EXTRE W/O DYE: CPT

## 2025-01-23 NOTE — ED NOTES
12:09 PM  I have evaluated the patient as the Provider in Triage. I have reviewed Her vital signs and the triage nurse assessment. I have talked with the patient and any available family and advised that I am the provider in triage and have ordered the appropriate study to initiate their work up based on the clinical presentation during my assessment. I have advised that the patient will be accommodated in the Main ED as soon as possible. I have also requested to contact the triage nurse or myself immediately if the patient experiences any changes in their condition during this brief waiting period.   JADA Rodriguez 34 F presenting to the ED for abdominal pain & vomiting x 1 day